# Patient Record
(demographics unavailable — no encounter records)

---

## 2025-05-07 NOTE — ASSESSMENT
[FreeTextEntry1] : # Neoplasm of uncertain behavior of face, L temple - Favor SCCIS vs AK, less likely LPLK Biopsy by tangential shave.  The risks/benefits/alternatives of skin biopsy were explained to the patient, which include and are not limited to bleeding, infection, scarring or discoloration of skin, and recurrence of lesion. Patient expressed understanding of these risks and provided consent to the procedure. Time out with verification of patient and lesion site was performed. Site was prepped with rubbing alcohol, lidocaine with epinephrine was injected for anesthesia, and biopsy was performed. Specimen sent to path. Procedure was without complication and well tolerated. Wound care was discussed.  # Actinic keratoses >15; face and scalp Procedure note: Cryotherapy  Verbal consent obtained. The risks/benefits/alternatives discussed including but not limited to risk of pain, inflammatory and blistering reaction, infection, risk of permanent scar and/or dyspigmentation, recurrence, possible lack of efficacy and need for repeat treatments. Site confirmed. 15 lesions treated with cryotherapy: liquid nitrogen x2 rapid freeze-slow thaw cycles. Discussed wound care instructions. Patient tolerated well with no immediate complications.  # History of BCC >10y ago, scalp - NER  # Dermatitis, favor xerosis with pruritus, no obvious inflammatory dermatosis based on exam today  - For face and affected areas on body, start hydrocortisone 2.5% ointment BID to affected areas up to 2 weeks on, 1 week off before resuming as needed - Principals of dry skin care discussed - Advised on regular use of gentle non-fragrance moisturizers, examples include plain Vaseline or CeraVe moisturizing cream  RTC 4-6 weeks, recommend FBSE at that time

## 2025-05-07 NOTE — HISTORY OF PRESENT ILLNESS
[FreeTextEntry1] : NP spots [de-identified] : 67yM new patient, last seen by outside derm about 10y ago, here for check of scalp and face. Notes several flaking and crusted areas, some with dried blood overlying. None symptomatic. Also complaining of dry skin, sometimes itchy, on face and body  History: BCC of scalp, treated with EDC 10y ago. Unsure if other skin cancers in past.

## 2025-05-07 NOTE — PHYSICAL EXAM
[FreeTextEntry3] : several pink gritty papules on face (temples, cheeks, forehead, scalp) brown and pink patch on R frontotemporal scalp L temple with thin pink scaly plaque

## 2025-06-09 NOTE — HISTORY OF PRESENT ILLNESS
[FreeTextEntry1] : Fu spots [de-identified] : 68yM, [facility] 5/5/25, here for FBSE. At [facility] performed LN2 to >15 lesions on scalp and face and had bx to one lesion on L temple which showed HAK with adnexal extension.  Also had dermatitis favored 2/2 pruritus/xerosis which has improved with HC 2.5%, he is not itchy today He is going to [place] at the end of this month  History: BCC of scalp, treated with EDC 10y ago. Unsure if other skin cancers in past.

## 2025-06-09 NOTE — ASSESSMENT
[FreeTextEntry1] : # Actinic keratoses >15; face and scalp along with # HAK with adnexal extension, L temple, biopsied [facility] 5/5/25 Procedure note: Cryotherapy  Verbal consent obtained. The risks/benefits/alternatives discussed including but not limited to risk of pain, inflammatory and blistering reaction, infection, risk of permanent scar and/or dyspigmentation, recurrence, possible lack of efficacy and need for repeat treatments. Site confirmed. 15 lesions treated with cryotherapy: liquid nitrogen x2 rapid freeze-slow thaw cycles. Discussed wound care instructions. Patient tolerated well with no immediate complications. Will recommend 3mo f/u and pursuing efudex in the fall given extent of actinic damage   # Benign nevi # SKs including ISK on R malar cheek - Counseled about clinically benign lesions - Discussed regular OTC sunscreen use, SPF 30 or higher - Declined LN2 for ISK on the cheek  #Full body skin check for skin cancer screening - no concerning lesions for skin cancer today - I have counseled the patient on the importance of sun protection and monthly self skin exams at home. We have discussed proper sun protection habits and techniques, monthly self-skin exams. I have asked the patient to notify me of any new or changing lesions.  # History of BCC >10y ago, scalp - NER  RTC 3 months for f/u AKs  NOT ADDRESSED TODAY # Dermatitis, favor xerosis with pruritus, no obvious inflammatory dermatosis based on exam today  - For face and affected areas on body, start hydrocortisone 2.5% ointment BID to affected areas up to 2 weeks on, 1 week off before resuming as needed - Principals of dry skin care discussed - Advised on regular use of gentle non-fragrance moisturizers, examples include plain Vaseline or CeraVe moisturizing cream

## 2025-06-09 NOTE — HISTORY OF PRESENT ILLNESS
[FreeTextEntry1] : Fu spots [de-identified] : 68yM, [facility] 5/5/25, here for FBSE. At [facility] performed LN2 to >15 lesions on scalp and face and had bx to one lesion on L temple which showed HAK with adnexal extension.  Also had dermatitis favored 2/2 pruritus/xerosis which has improved with HC 2.5%, he is not itchy today He is going to [place] at the end of this month  History: BCC of scalp, treated with EDC 10y ago. Unsure if other skin cancers in past.

## 2025-06-09 NOTE — ASSESSMENT
[FreeTextEntry1] : # Actinic keratoses >15; face and scalp along with # HAK with adnexal extension, L temple, biopsied [facility] 5/5/25 Procedure note: Cryotherapy  Verbal consent obtained. The risks/benefits/alternatives discussed including but not limited to risk of pain, inflammatory and blistering reaction, infection, risk of permanent scar and/or dyspigmentation, recurrence, possible lack of efficacy and need for repeat treatments. Site confirmed. 15 lesions treated with cryotherapy: liquid nitrogen x2 rapid freeze-slow thaw cycles. Discussed wound care instructions. Patient tolerated well with no immediate complications. Will recommend 3mo f/u and pursuing efudex in the fall given extent of actinic damage   # Benign nevi # SKs including ISK on R malar cheek - Counseled about clinically benign lesions - Discussed regular OTC sunscreen use, SPF 30 or higher - Declined LN2 for ISK on the cheek  #Full body skin check for skin cancer screening - no concerning lesions for skin cancer today - I have counseled the patient on the importance of sun protection and monthly self skin exams at home. We have discussed proper sun protection habits and techniques, monthly self-skin exams. I have asked the patient to notify me of any new or changing lesions.  # History of BCC >10y ago, scalp - NER  RTC 3 months for f/u AKs  NOT ADDRESSED TODAY # Dermatitis, favor xerosis with pruritus, no obvious inflammatory dermatosis based on exam today  - For face and affected areas on body, start hydrocortisone 2.5% ointment BID to affected areas up to 2 weeks on, 1 week off before resuming as needed - Principals of dry skin care discussed - Advised on regular use of gentle non-fragrance moisturizers, examples include plain Vaseline or CeraVe moisturizing cream  Cheilitis Aggressive Treatment: I recommended application of Vaseline or Aquaphor numerous times a day (as often as every hour) and before going to bed. I also prescribed a topical steroid for twice daily use.

## 2025-06-09 NOTE — PHYSICAL EXAM
[Alert] : alert [Well Nourished] : well nourished [Conjunctiva Non-injected] : conjunctiva non-injected [No Visual Lymphadenopathy] : no visual  lymphadenopathy [No Clubbing] : no clubbing [No Edema] : no edema [No Bromhidrosis] : no bromhidrosis [No Chromhidrosis] : no chromhidrosis [Full Body Skin Exam Performed] : performed [FreeTextEntry3] : several pink gritty papules on head (temples, cheeks, forehead, scalp) and b/l forearms L temple with well healing pink scar with strawberry pattern on dermoscopy tan/brown macules scattered throughout trunk/extremities tan/brown greasy stuck-on papules scattered throughout head/trunk/extremities including one with central heme crust on R malar cheek